# Patient Record
Sex: FEMALE | Race: BLACK OR AFRICAN AMERICAN | NOT HISPANIC OR LATINO | URBAN - METROPOLITAN AREA
[De-identification: names, ages, dates, MRNs, and addresses within clinical notes are randomized per-mention and may not be internally consistent; named-entity substitution may affect disease eponyms.]

---

## 2020-09-14 ENCOUNTER — HOSPITAL ENCOUNTER (EMERGENCY)
Facility: HOSPITAL | Age: 30
Discharge: HOME | End: 2020-09-15
Attending: EMERGENCY MEDICINE
Payer: MEDICAID

## 2020-09-14 DIAGNOSIS — O21.0 HYPEREMESIS GRAVIDARUM: Primary | ICD-10-CM

## 2020-09-14 LAB
ALBUMIN SERPL BCP-MCNC: 4.4 G/DL (ref 3.5–5.2)
ALP SERPL-CCNC: 48 U/L (ref 55–135)
ALT SERPL W/O P-5'-P-CCNC: 67 U/L (ref 10–44)
ANION GAP SERPL CALC-SCNC: 11 MMOL/L (ref 8–16)
AST SERPL-CCNC: 56 U/L (ref 10–40)
B-OH-BUTYR BLD STRIP-SCNC: 0.9 MMOL/L (ref 0–0.5)
BASOPHILS # BLD AUTO: 0.04 K/UL (ref 0–0.2)
BASOPHILS NFR BLD: 0.3 % (ref 0–1.9)
BILIRUB SERPL-MCNC: 0.8 MG/DL (ref 0.1–1)
BUN SERPL-MCNC: 6 MG/DL (ref 6–20)
CALCIUM SERPL-MCNC: 9.8 MG/DL (ref 8.7–10.5)
CHLORIDE SERPL-SCNC: 99 MMOL/L (ref 95–110)
CO2 SERPL-SCNC: 23 MMOL/L (ref 23–29)
CREAT SERPL-MCNC: 0.7 MG/DL (ref 0.5–1.4)
DIFFERENTIAL METHOD: ABNORMAL
EOSINOPHIL # BLD AUTO: 0 K/UL (ref 0–0.5)
EOSINOPHIL NFR BLD: 0.2 % (ref 0–8)
ERYTHROCYTE [DISTWIDTH] IN BLOOD BY AUTOMATED COUNT: 12.7 % (ref 11.5–14.5)
EST. GFR  (AFRICAN AMERICAN): >60 ML/MIN/1.73 M^2
EST. GFR  (NON AFRICAN AMERICAN): >60 ML/MIN/1.73 M^2
GLUCOSE SERPL-MCNC: 77 MG/DL (ref 70–110)
HCT VFR BLD AUTO: 44.2 % (ref 37–48.5)
HGB BLD-MCNC: 15.1 G/DL (ref 12–16)
IMM GRANULOCYTES # BLD AUTO: 0.05 K/UL (ref 0–0.04)
IMM GRANULOCYTES NFR BLD AUTO: 0.4 % (ref 0–0.5)
LYMPHOCYTES # BLD AUTO: 2 K/UL (ref 1–4.8)
LYMPHOCYTES NFR BLD: 16.5 % (ref 18–48)
MCH RBC QN AUTO: 31.1 PG (ref 27–31)
MCHC RBC AUTO-ENTMCNC: 34.2 G/DL (ref 32–36)
MCV RBC AUTO: 91 FL (ref 82–98)
MONOCYTES # BLD AUTO: 1.1 K/UL (ref 0.3–1)
MONOCYTES NFR BLD: 8.7 % (ref 4–15)
NEUTROPHILS # BLD AUTO: 9 K/UL (ref 1.8–7.7)
NEUTROPHILS NFR BLD: 73.9 % (ref 38–73)
NRBC BLD-RTO: 0 /100 WBC
PLATELET # BLD AUTO: 281 K/UL (ref 150–350)
PMV BLD AUTO: 9.9 FL (ref 9.2–12.9)
POTASSIUM SERPL-SCNC: 3.6 MMOL/L (ref 3.5–5.1)
PROT SERPL-MCNC: 7.7 G/DL (ref 6–8.4)
RBC # BLD AUTO: 4.85 M/UL (ref 4–5.4)
SODIUM SERPL-SCNC: 133 MMOL/L (ref 136–145)
WBC # BLD AUTO: 12.15 K/UL (ref 3.9–12.7)

## 2020-09-14 PROCEDURE — 82010 KETONE BODYS QUAN: CPT

## 2020-09-14 PROCEDURE — 96361 HYDRATE IV INFUSION ADD-ON: CPT

## 2020-09-14 PROCEDURE — 96367 TX/PROPH/DG ADDL SEQ IV INF: CPT

## 2020-09-14 PROCEDURE — 96366 THER/PROPH/DIAG IV INF ADDON: CPT

## 2020-09-14 PROCEDURE — 80053 COMPREHEN METABOLIC PANEL: CPT

## 2020-09-14 PROCEDURE — 96365 THER/PROPH/DIAG IV INF INIT: CPT

## 2020-09-14 PROCEDURE — 99284 PR EMERGENCY DEPT VISIT,LEVEL IV: ICD-10-PCS | Mod: ,,, | Performed by: EMERGENCY MEDICINE

## 2020-09-14 PROCEDURE — 80047 BASIC METABLC PNL IONIZED CA: CPT

## 2020-09-14 PROCEDURE — 81001 URINALYSIS AUTO W/SCOPE: CPT

## 2020-09-14 PROCEDURE — 99285 EMERGENCY DEPT VISIT HI MDM: CPT | Mod: 25

## 2020-09-14 PROCEDURE — 25000003 PHARM REV CODE 250: Performed by: NURSE PRACTITIONER

## 2020-09-14 PROCEDURE — 63600175 PHARM REV CODE 636 W HCPCS: Performed by: NURSE PRACTITIONER

## 2020-09-14 PROCEDURE — 99284 EMERGENCY DEPT VISIT MOD MDM: CPT | Mod: ,,, | Performed by: EMERGENCY MEDICINE

## 2020-09-14 PROCEDURE — 85025 COMPLETE CBC W/AUTO DIFF WBC: CPT

## 2020-09-14 RX ADMIN — PROMETHAZINE HYDROCHLORIDE 12.5 MG: 25 INJECTION INTRAMUSCULAR; INTRAVENOUS at 11:09

## 2020-09-14 RX ADMIN — SODIUM CHLORIDE 1000 ML: 0.9 INJECTION, SOLUTION INTRAVENOUS at 11:09

## 2020-09-15 VITALS
RESPIRATION RATE: 20 BRPM | SYSTOLIC BLOOD PRESSURE: 125 MMHG | HEIGHT: 61 IN | TEMPERATURE: 99 F | DIASTOLIC BLOOD PRESSURE: 66 MMHG | OXYGEN SATURATION: 100 % | WEIGHT: 155 LBS | BODY MASS INDEX: 29.27 KG/M2 | HEART RATE: 77 BPM

## 2020-09-15 LAB
BACTERIA #/AREA URNS AUTO: ABNORMAL /HPF
BILIRUB UR QL STRIP: NEGATIVE
BUN SERPL-MCNC: <3 MG/DL (ref 6–30)
CHLORIDE SERPL-SCNC: 102 MMOL/L (ref 95–110)
CLARITY UR REFRACT.AUTO: ABNORMAL
COLOR UR AUTO: ABNORMAL
CREAT SERPL-MCNC: 0.4 MG/DL (ref 0.5–1.4)
CTP QC/QA: YES
GLUCOSE SERPL-MCNC: 94 MG/DL (ref 70–110)
GLUCOSE UR QL STRIP: NEGATIVE
HCT VFR BLD CALC: 37 %PCV (ref 36–54)
HGB UR QL STRIP: NEGATIVE
HYALINE CASTS UR QL AUTO: 0 /LPF
KETONES UR QL STRIP: ABNORMAL
LEUKOCYTE ESTERASE UR QL STRIP: NEGATIVE
MICROSCOPIC COMMENT: ABNORMAL
NITRITE UR QL STRIP: NEGATIVE
PH UR STRIP: 5 [PH] (ref 5–8)
POC IONIZED CALCIUM: 1.22 MMOL/L (ref 1.06–1.42)
POC TCO2 (MEASURED): 19 MMOL/L (ref 23–29)
POTASSIUM BLD-SCNC: 3.4 MMOL/L (ref 3.5–5.1)
PROT UR QL STRIP: ABNORMAL
RBC #/AREA URNS AUTO: 2 /HPF (ref 0–4)
SAMPLE: ABNORMAL
SARS-COV-2 RDRP RESP QL NAA+PROBE: NEGATIVE
SODIUM BLD-SCNC: 137 MMOL/L (ref 136–145)
SP GR UR STRIP: >=1.03 (ref 1–1.03)
SQUAMOUS #/AREA URNS AUTO: 12 /HPF
URN SPEC COLLECT METH UR: ABNORMAL
WBC #/AREA URNS AUTO: 1 /HPF (ref 0–5)

## 2020-09-15 PROCEDURE — 93010 ELECTROCARDIOGRAM REPORT: CPT | Mod: ,,, | Performed by: INTERNAL MEDICINE

## 2020-09-15 PROCEDURE — 25000003 PHARM REV CODE 250: Performed by: NURSE PRACTITIONER

## 2020-09-15 PROCEDURE — 25000003 PHARM REV CODE 250: Performed by: EMERGENCY MEDICINE

## 2020-09-15 PROCEDURE — U0002 COVID-19 LAB TEST NON-CDC: HCPCS | Performed by: PHYSICIAN ASSISTANT

## 2020-09-15 PROCEDURE — 93005 ELECTROCARDIOGRAM TRACING: CPT

## 2020-09-15 PROCEDURE — 80047 BASIC METABLC PNL IONIZED CA: CPT

## 2020-09-15 PROCEDURE — 93010 EKG 12-LEAD: ICD-10-PCS | Mod: ,,, | Performed by: INTERNAL MEDICINE

## 2020-09-15 RX ORDER — DEXTROSE MONOHYDRATE, SODIUM CHLORIDE, AND POTASSIUM CHLORIDE 50; 1.49; 4.5 G/1000ML; G/1000ML; G/1000ML
1000 INJECTION, SOLUTION INTRAVENOUS
Status: COMPLETED | OUTPATIENT
Start: 2020-09-15 | End: 2020-09-15

## 2020-09-15 RX ADMIN — POTASSIUM CHLORIDE, DEXTROSE MONOHYDRATE AND SODIUM CHLORIDE 1000 ML: 150; 5; 450 INJECTION, SOLUTION INTRAVENOUS at 01:09

## 2020-09-15 RX ADMIN — SODIUM CHLORIDE 1000 ML: 0.9 INJECTION, SOLUTION INTRAVENOUS at 12:09

## 2020-09-15 NOTE — ED NOTES
I-STAT Chem-8+ Results:   Value Reference Range   Sodium 137 136-145 mmol/L   Potassium  3.4 3.5-5.1 mmol/L   Chloride 102  mmol/L   Ionized Calcium 1.22 1.06-1.42 mmol/L   CO2 (measured) 19 23-29 mmol/L   Glucose 94  mg/dL   BUN <3 6-30 mg/dL   Creatinine 0.4 0.5-1.4 mg/dL   Hematocrit 37 36-54%

## 2020-09-15 NOTE — ED NOTES
Charge nurse and isacc made aware that infusion started by previous nurse was completed. Pt to be transferred to ED bed.

## 2020-09-15 NOTE — ED PROVIDER NOTES
Encounter Date: 9/14/2020       History     Chief Complaint   Patient presents with    Vomiting     Third visit in a week for vomitting and discharge w/ reglan w/ minimal relief. Six week pregnant. Hot flashes. Denies any pelvic cramping, or bleeding.      This is the urgent evaluation a 29-year-old black female with no significant past medical history who presents emergency department complaining nausea, vomiting diarrhea that began on September 2nd.  Patient is reportedly 6 weeks pregnant with twins, she was seen at 2 other emergency departments over the last week with same symptoms.  She was seen on the a 11th at [facility] and had a full workup with an ultrasound that showed 2 gestational sacs with 2 small subchorionic bleeds.  She was discharged home with Reglan but is still unable tolerate any oral intake.  SHe has denies any vaginal bleeding with me today.  Patient reports that this is her 4th pregnancy and she has never experienced morning sickness in the past.  She denies any abdominal pain or any abdominal cramping.        Review of patient's allergies indicates:  No Known Allergies  No past medical history on file.  No past surgical history on file.  No family history on file.  Social History     Tobacco Use    Smoking status: Not on file   Substance Use Topics    Alcohol use: Not on file    Drug use: Not on file     Review of Systems   Constitutional: Negative for chills and fever.   Respiratory: Negative for cough and shortness of breath.    Cardiovascular: Negative for chest pain.   Gastrointestinal: Positive for diarrhea, nausea and vomiting. Negative for abdominal pain.   Musculoskeletal: Negative for back pain and neck pain.   Neurological: Negative for dizziness, weakness and headaches.   All other systems reviewed and are negative.      Physical Exam     Initial Vitals [09/14/20 2219]   BP Pulse Resp Temp SpO2   (!) 139/90 85 18 98.1 °F (36.7 °C) 98 %      MAP       --         Vitals:    09/14/20  "2219 09/15/20 0040   BP: (!) 139/90 (!) 108/59   Pulse: 85 65   Resp: 18 18   Temp: 98.1 °F (36.7 °C) 98.5 °F (36.9 °C)   TempSrc: Oral Oral   SpO2: 98% 97%   Weight: 70.3 kg (155 lb)    Height: 5' 1" (1.549 m)        Physical Exam    Nursing note and vitals reviewed.  Constitutional: Vital signs are normal. She appears well-developed and well-nourished. She appears ill. No distress.   Ill-appearing black female   HENT:   Head: Normocephalic and atraumatic.   Mouth/Throat: Mucous membranes are not pale and dry.   Eyes: Conjunctivae are normal. Pupils are equal, round, and reactive to light.   Neck: Neck supple.   Cardiovascular: Normal rate, regular rhythm, S1 normal, S2 normal and normal heart sounds. Exam reveals no gallop.    No murmur heard.  Pulmonary/Chest: Effort normal and breath sounds normal. No tachypnea. She has no decreased breath sounds. She has no wheezes.   Abdominal: Soft. Bowel sounds are normal. There is no abdominal tenderness.   Neurological: She is alert and oriented to person, place, and time. GCS eye subscore is 4. GCS verbal subscore is 5. GCS motor subscore is 6.   Skin: Skin is warm, dry and intact.   Psychiatric: She has a normal mood and affect. Her behavior is normal.         ED Course   Procedures  Labs Reviewed   CBC W/ AUTO DIFFERENTIAL - Abnormal; Notable for the following components:       Result Value    Mean Corpuscular Hemoglobin 31.1 (*)     Gran # (ANC) 9.0 (*)     Immature Grans (Abs) 0.05 (*)     Mono # 1.1 (*)     Gran% 73.9 (*)     Lymph% 16.5 (*)     All other components within normal limits   COMPREHENSIVE METABOLIC PANEL - Abnormal; Notable for the following components:    Sodium 133 (*)     Alkaline Phosphatase 48 (*)     AST 56 (*)     ALT 67 (*)     All other components within normal limits   URINALYSIS, REFLEX TO URINE CULTURE - Abnormal; Notable for the following components:    Appearance, UA Hazy (*)     Specific Gravity, UA >=1.030 (*)     Protein, UA 1+ (*)     " Ketones, UA 3+ (*)     All other components within normal limits    Narrative:     Specimen Source->Urine   BETA - HYDROXYBUTYRATE, SERUM - Abnormal; Notable for the following components:    Beta-Hydroxybutyrate 0.9 (*)     All other components within normal limits   URINALYSIS MICROSCOPIC - Abnormal; Notable for the following components:    Bacteria Many (*)     All other components within normal limits    Narrative:     Specimen Source->Urine          Imaging Results    None          Medical Decision Making:   Differential Diagnosis:   Dehydration, hyperemesis gravidarum, electrolyte imbalances, ketonuria  Clinical Tests:   Lab Tests: Ordered and Reviewed  ED Management:  29-year-old female with hyperemesis gravidarum over the past week and a half, previous ED visit she was discharged home with Reglan with no improvement and vomiting.  Patient was given a L of fluid here along with Phenergan, she is tolerating a minimal amount of oral intake.  Patient has ketones in her blood and urine, due to this I believe transfer to [facility] for OB services and IV hydration is needed.  Patient is agreeable to this transfer.              Attending Attestation:     Physician Attestation Statement for NP/PA:   I have conducted a face to face encounter with this patient in addition to the NP/PA, due to Medical Complexity    Other NP/PA Attestation Additions:      Medical Decision Makin-year-old female G4 at 6 weeks gestational age with twin gestation presenting to emergency department with complaint of nausea and vomiting.  This is her 3rd visit to the emergency department for this issue within 1 week.  She was previously seen at [facility] and [facility].  At [facility] on the , she had an ultrasound done which showed a twin gestation with 2 IUPs.  Both had a fetal heart rate.  The patient does not have any vaginal bleeding.  Here she was given Reglan and Phenergan but continues to have nausea and is unable to tolerate p.o..  Her urine  did have ketones.    We attempted to obtain bedside Dopplers of the fetal heart tones for this pregnancy, unable to obtain fetal heart tones.  I performed a bedside ultrasound which showed 2 gestational sacs, 1 with a fetal pole and 1 right could not identify a fetal pole.  I did not observe fetal heartbeat, however I was limited by my equipment.    Patient had also been ordered a rate of D5 0.45 NS with potassium supplementation for her hyperemesis gravidarum.  Unfortunately this was bolused instead of given as a rate of 100/hour as ordered.  I did recheck her potassium on an i-STAT, obtain an EKG, and check her serum blood glucose with no detrimental change.    As we do not have OB services here, I recommended transfer.  We offered her transfer within our system to [facility] which is the closest site in our system that has obstetrics and Gynecology, versus transfer to [facility] where her obstetrician is.  Patient requested transfer to [facility].  Case was facilitated through our transfer center, I spoke directly with the accepting physician.                           Clinical Impression:       ICD-10-CM ICD-9-CM   1. Hyperemesis gravidarum  O21.0 643.00                                  ELIZABETH Matt  09/15/20 0116       Dunia Cline MD  09/15/20 0558

## 2023-12-28 ENCOUNTER — HOSPITAL ENCOUNTER (EMERGENCY)
Facility: HOSPITAL | Age: 33
Discharge: HOME | End: 2023-12-28
Attending: EMERGENCY MEDICINE
Payer: MEDICAID

## 2023-12-28 VITALS
TEMPERATURE: 98 F | BODY MASS INDEX: 28.32 KG/M2 | RESPIRATION RATE: 17 BRPM | WEIGHT: 150 LBS | DIASTOLIC BLOOD PRESSURE: 81 MMHG | HEART RATE: 98 BPM | HEIGHT: 61 IN | OXYGEN SATURATION: 98 % | SYSTOLIC BLOOD PRESSURE: 146 MMHG

## 2023-12-28 DIAGNOSIS — K08.89 PAIN, DENTAL: Primary | ICD-10-CM

## 2023-12-28 DIAGNOSIS — K04.7 DENTAL ABSCESS: ICD-10-CM

## 2023-12-28 PROCEDURE — 25000003 PHARM REV CODE 250

## 2023-12-28 PROCEDURE — 41800 DRAINAGE OF GUM LESION: CPT

## 2023-12-28 PROCEDURE — 99283 EMERGENCY DEPT VISIT LOW MDM: CPT | Mod: 25

## 2023-12-28 RX ORDER — NAPROXEN 500 MG/1
500 TABLET ORAL
Status: COMPLETED | OUTPATIENT
Start: 2023-12-28 | End: 2023-12-28

## 2023-12-28 RX ORDER — NAPROXEN 500 MG/1
500 TABLET ORAL 2 TIMES DAILY WITH MEALS
Qty: 20 TABLET | Refills: 0 | Status: SHIPPED | OUTPATIENT
Start: 2023-12-28 | End: 2024-01-07

## 2023-12-28 RX ADMIN — NAPROXEN 500 MG: 500 TABLET ORAL at 03:12

## 2023-12-28 NOTE — ED PROVIDER NOTES
Encounter Date: 12/28/2023       History     Chief Complaint   Patient presents with    Dental Pain     Pain to left upper molar. Seen at [facility] earlier for same thing. States pain and swelling has gotten worse. Denies airway complaints     33-year-old female presenting to the emergency department for evaluation of persistent gum pain and worsening left facial swelling.  Patient reports that she went to outside emergency department earlier in the day and was evaluated.  There she was given amoxicillin and instructed to follow up with Dentistry.  She reports that she was given a topical oral numbing cream with minimal improvement.  She reports throughout the day she had worsening swelling and persistent pain.  She taken 2 doses of Augmentin at this time.  She reports that she does not currently have a dentist due to insurance issues.  Denies any fevers, nausea vomiting unable tolerate oral intake, difficulty swallowing or any other concerns at this time.    The history is provided by the patient.     Review of patient's allergies indicates:  No Known Allergies  No past medical history on file.  No past surgical history on file.  No family history on file.         Physical Exam     Initial Vitals [12/28/23 0155]   BP Pulse Resp Temp SpO2   (!) 146/81 98 17 98.2 °F (36.8 °C) 98 %      MAP       --       Physical Exam  Vitals and nursing note reviewed.   HENT:      Head: Normocephalic and atraumatic.      Mouth/Throat:      Dentition: Dental tenderness and dental abscesses present.   Cardiovascular:      Rate and Rhythm: Normal rate and regular rhythm.   Musculoskeletal:      Cervical back: Normal range of motion. No tenderness.   Neurological:      Mental Status: She is alert.         ED Course   I & D - Incision and Drainage    Date/Time: 12/28/2023 3:22 AM  Location procedure was performed: [facility] EMERGENCY DEPARTMENT    Performed by: Al Bedoya MD  Authorized by: Elijah Hensley MD  Type: abscess  Body area:  mouth  Anesthesia: local infiltration    Anesthesia:  Local Anesthetic: lidocaine 1% without epinephrine  Scalpel size: 11  Incision type: single straight  Complexity: simple  Drainage: serosanguinous  Drainage amount: moderate  Complications: No  Specimens: No  Implants: No  Patient tolerance: Patient tolerated the procedure well with no immediate complications        Labs Reviewed - No data to display       Imaging Results    None          Medications   naproxen tablet 500 mg (500 mg Oral Given 12/28/23 2962)     Medical Decision Making  33-year-old female who presented emergency department for facial swelling.  Upon assessment patient is alert oriented in no acute distress.  On examination patient has left facial swelling tenderness to the superior left gums.  Area of fluctuance concerning for abscess.  Abscess drained at bedside with improvement in swelling and pain at this time.  See procedure note for details.  At this time patient is stable for discharge.  Patient discharged after receiving a dose of naproxen and instructed to follow-up with dentistry and continue antibiotics as previously prescribed.  Patient at this time is agreeable with plan.    Risk  Prescription drug management.                                      Clinical Impression:  Final diagnoses:  [K08.89] Pain, dental (Primary)  [K04.7] Dental abscess                 Al Bedoya MD  Resident  12/28/23 7740

## 2023-12-28 NOTE — DISCHARGE INSTRUCTIONS
Control take naproxen as prescribed.  Follow-up with dentist.  Continue take antibiotics as previously prescribed.    If you develop muffled voice, difficulty swallowing, nausea vomiting unable tolerate oral intake, worsening fevers or any other new or concerning symptoms.   Size Of Lesion In Cm (Optional): 0 Detail Level: Simple

## 2023-12-28 NOTE — ED TRIAGE NOTES
Rhiannon Urban, a 33 y.o. female presents to the ED w/ complaint of left side facial swelling. States started in a tooth that bothers her on and off.    Triage note:  Chief Complaint   Patient presents with    Dental Pain     Pain to left upper molar. Seen at [facility] earlier for same thing. States pain and swelling has gotten worse. Denies airway complaints     Review of patient's allergies indicates:  No Known Allergies  No past medical history on file.     LOC: The patient is awake, alert, aware of environment with an appropriate affect. Oriented x4, speaking appropriately  APPEARANCE: Pt resting comfortably, in no acute distress, pt is clean and well groomed, clothing properly fastened  SKIN:The skin is warm and dry, color consistent with ethnicity, patient has normal skin turgor and moist mucus membranes, no bruising noted  obvious swelling to left side of cheek  RESPIRATORY:Airway is open and patent, respirations are spontaneous, patient has a normal effort and rate, no accessory muscle use noted.  CARDIAC: Normal rate and rhythm, no peripheral edema noted, capillary refill < 3 seconds, bilateral radial pulses 2+.  ABDOMEN: Soft, non tender, non distended. Denies v/d  NEUROLOGIC: PERRLA, facial expression is symmetrical, patient moving all extremities spontaneously, normal sensation in all extremities when touched with a finger.  Follows all commands appropriately  MUSCULOSKELETAL: Patient moving all extremities spontaneously, no obvious swelling or deformities noted.

## 2024-02-22 ENCOUNTER — HOSPITAL ENCOUNTER (EMERGENCY)
Facility: HOSPITAL | Age: 34
Discharge: HOME | End: 2024-02-22
Attending: EMERGENCY MEDICINE
Payer: MEDICAID

## 2024-02-22 VITALS
RESPIRATION RATE: 17 BRPM | TEMPERATURE: 100 F | OXYGEN SATURATION: 96 % | HEART RATE: 77 BPM | SYSTOLIC BLOOD PRESSURE: 126 MMHG | DIASTOLIC BLOOD PRESSURE: 75 MMHG

## 2024-02-22 DIAGNOSIS — J10.1 INFLUENZA B: ICD-10-CM

## 2024-02-22 DIAGNOSIS — U07.1 COVID-19: Primary | ICD-10-CM

## 2024-02-22 PROBLEM — O30.049 DICHORIONIC DIAMNIOTIC TWIN PREGNANCY: Status: ACTIVE | Noted: 2020-12-02

## 2024-02-22 PROBLEM — O23.00 PYELONEPHRITIS AFFECTING PREGNANCY: Status: ACTIVE | Noted: 2021-03-08

## 2024-02-22 PROBLEM — O21.1 HYPEREMESIS GRAVIDARUM BEFORE END OF 22 WEEK GESTATION WITH DEHYDRATION: Status: ACTIVE | Noted: 2020-09-15

## 2024-02-22 PROBLEM — O60.00 PRETERM LABOR: Status: ACTIVE | Noted: 2021-03-05

## 2024-02-22 PROBLEM — O35.BXX0 FETAL CARDIAC ANOMALY AFFECTING PREGNANCY, ANTEPARTUM: Status: ACTIVE | Noted: 2021-01-15

## 2024-02-22 LAB
B-HCG UR QL: NEGATIVE
CTP QC/QA: YES
INFLUENZA A, MOLECULAR: NEGATIVE
INFLUENZA B, MOLECULAR: POSITIVE
SARS-COV-2 RDRP RESP QL NAA+PROBE: POSITIVE
SPECIMEN SOURCE: ABNORMAL

## 2024-02-22 PROCEDURE — 25000003 PHARM REV CODE 250: Performed by: STUDENT IN AN ORGANIZED HEALTH CARE EDUCATION/TRAINING PROGRAM

## 2024-02-22 PROCEDURE — 99284 EMERGENCY DEPT VISIT MOD MDM: CPT

## 2024-02-22 PROCEDURE — 25000003 PHARM REV CODE 250: Performed by: PHYSICIAN ASSISTANT

## 2024-02-22 PROCEDURE — U0002 COVID-19 LAB TEST NON-CDC: HCPCS | Performed by: STUDENT IN AN ORGANIZED HEALTH CARE EDUCATION/TRAINING PROGRAM

## 2024-02-22 PROCEDURE — 87502 INFLUENZA DNA AMP PROBE: CPT | Performed by: STUDENT IN AN ORGANIZED HEALTH CARE EDUCATION/TRAINING PROGRAM

## 2024-02-22 PROCEDURE — 81025 URINE PREGNANCY TEST: CPT | Performed by: PHYSICIAN ASSISTANT

## 2024-02-22 RX ORDER — BENZONATATE 100 MG/1
100 CAPSULE ORAL 3 TIMES DAILY PRN
Qty: 20 CAPSULE | Refills: 0 | Status: SHIPPED | OUTPATIENT
Start: 2024-02-22 | End: 2024-03-03

## 2024-02-22 RX ORDER — OSELTAMIVIR PHOSPHATE 75 MG/1
75 CAPSULE ORAL 2 TIMES DAILY
Qty: 10 CAPSULE | Refills: 0 | Status: SHIPPED | OUTPATIENT
Start: 2024-02-22 | End: 2024-02-27

## 2024-02-22 RX ORDER — IBUPROFEN 600 MG/1
600 TABLET ORAL
Status: COMPLETED | OUTPATIENT
Start: 2024-02-22 | End: 2024-02-22

## 2024-02-22 RX ORDER — NAPROXEN 500 MG/1
500 TABLET ORAL 2 TIMES DAILY PRN
Qty: 20 TABLET | Refills: 0 | Status: SHIPPED | OUTPATIENT
Start: 2024-02-22

## 2024-02-22 RX ORDER — BENZONATATE 100 MG/1
100 CAPSULE ORAL
Status: COMPLETED | OUTPATIENT
Start: 2024-02-22 | End: 2024-02-22

## 2024-02-22 RX ORDER — ONDANSETRON 8 MG/1
8 TABLET, ORALLY DISINTEGRATING ORAL
Status: COMPLETED | OUTPATIENT
Start: 2024-02-22 | End: 2024-02-22

## 2024-02-22 RX ORDER — ONDANSETRON 4 MG/1
4 TABLET, ORALLY DISINTEGRATING ORAL EVERY 6 HOURS PRN
Qty: 15 TABLET | Refills: 0 | Status: SHIPPED | OUTPATIENT
Start: 2024-02-22

## 2024-02-22 RX ORDER — ACETAMINOPHEN 325 MG/1
650 TABLET ORAL
Status: COMPLETED | OUTPATIENT
Start: 2024-02-22 | End: 2024-02-22

## 2024-02-22 RX ADMIN — IBUPROFEN 600 MG: 600 TABLET, FILM COATED ORAL at 07:02

## 2024-02-22 RX ADMIN — ACETAMINOPHEN 650 MG: 325 TABLET ORAL at 07:02

## 2024-02-22 RX ADMIN — BENZONATATE 100 MG: 100 CAPSULE ORAL at 08:02

## 2024-02-22 RX ADMIN — ONDANSETRON 8 MG: 8 TABLET, ORALLY DISINTEGRATING ORAL at 08:02

## 2024-02-22 NOTE — Clinical Note
"Relistlucia Urban (relista) was seen and treated in our emergency department on 2/22/2024.     COVID-19 is present in our communities across the state. There is limited testing for COVID at this time, so not all patients can be tested. In this situation, your employee meets the following criteria:    Rhiannon Urban has met the criteria for COVID-19 testing and has a POSITIVE result. She can return to work once they are asymptomatic for 24 hours without the use of fever reducing medications AND at least five days from the first positive result. A mask is recommended for 5 days post quarantine.     If you have any questions or concerns, or if I can be of further assistance, please do not hesitate to contact me.    Sincerely,             Chela Monterroso PA-C"

## 2024-02-22 NOTE — Clinical Note
"Rhiannon "ilda Urban was seen and treated in our emergency department on 2/22/2024.  She may return to work on 02/28/2024.       If you have any questions or concerns, please don't hesitate to call.      Chela Monterroso PA-C"

## 2024-02-23 NOTE — DISCHARGE INSTRUCTIONS
Diagnosis:  COVID 19, influenza B    You tested positive for both COVID-19 and the flu.  I am prescribing medicine for flu called Tamiflu.  This has been shown to decrease your duration of symptoms but only works within the 1st 48 hours some symptoms so I am not sure if this will help.  I am also prescribing medicine for nausea, pain and cough that you can take as needed. You should take Tylenol as needed for pain up to 3 grams daily which is 6 of the 500 mg extra strength tablets.  If you have no appetite, make sure that you are drinking fluids.  You can drink something with electrolytes such as Pedialyte, Gatorade, coconut water, Liquid Iv, etc.     It is common to feel sick for a full week with the flu.  You can also be sick with COVID-19 for a full week so I think that you should self isolate for at least a week.  When you are feeling better, please schedule a follow-up appointment with your primary care doctor.  If you start to have any worsening symptoms, or new symptoms such as passing out, inability to hold down fluids, trouble breathing or other concerns please return to the emergency department immediately.

## 2024-02-23 NOTE — ED PROVIDER NOTES
Encounter Date: 2/22/2024       History     Chief Complaint   Patient presents with    Flu Like Symptoms     Patient reports chills, generalized body aches, nausea. States that she has had sick contacts at work.     33-year-old female with no significant past medical history presents for chills, sweats, myalgias, headache, cough, vomiting and sore throat for about 4 or 5 days.  Reports that many of her coworkers are also sick.      Review of patient's allergies indicates:  No Known Allergies  History reviewed. No pertinent past medical history.  History reviewed. No pertinent surgical history.  History reviewed. No pertinent family history.     Review of Systems    Physical Exam     Initial Vitals [02/22/24 1917]   BP Pulse Resp Temp SpO2   128/81 97 18 100 °F (37.8 °C) 97 %      MAP       --         Physical Exam    Nursing note and vitals reviewed.  Constitutional: She appears well-developed and well-nourished.   HENT:   Nose: Rhinorrhea present.   Mouth/Throat: Uvula is midline, oropharynx is clear and moist and mucous membranes are normal.   Cardiovascular:  Normal rate, regular rhythm, normal heart sounds and intact distal pulses.           Pulmonary/Chest: Breath sounds normal.   Abdominal: Abdomen is soft. Bowel sounds are normal. She exhibits no distension. There is no abdominal tenderness.   Musculoskeletal:         General: Normal range of motion.     Lymphadenopathy:     She has no cervical adenopathy.   Neurological: She is alert and oriented to person, place, and time.         ED Course   Procedures  Labs Reviewed   INFLUENZA A & B BY MOLECULAR - Abnormal; Notable for the following components:       Result Value    Influenza B, Molecular Positive (*)     All other components within normal limits   SARS-COV-2 RNA AMPLIFICATION, QUAL - Abnormal; Notable for the following components:    SARS-CoV-2 RNA, Amplification, Qual Positive (*)     All other components within normal limits   POCT URINE PREGNANCY           Imaging Results    None          Medications   acetaminophen tablet 650 mg (650 mg Oral Given 2/22/24 1931)   ibuprofen tablet 600 mg (600 mg Oral Given 2/22/24 1931)   ondansetron disintegrating tablet 8 mg (8 mg Oral Given 2/22/24 2052)   benzonatate capsule 100 mg (100 mg Oral Given 2/22/24 2051)     Medical Decision Making  33-year-old female presenting for cough, fever, myalgias, multiple other symptoms.  Her vitals are normal, she appears ill but nontoxic.  Differential diagnosis includes COVID-19, influenza, pregnancy, viral syndrome.    COVID-19 test is positive.  Influenza B test is also positive.  Given unclear timeline will treat with Tamiflu.  I instructed the patient to self isolate to avoid infecting others.  Patient provided with medications for symptomatic control and instructed to follow up with PCP after quarantine and return to the ED for any worsening symptoms. Stressed the importance of follow-up, strict ED return precautions given.  Patient voiced understanding and is comfortable with discharge.      Amount and/or Complexity of Data Reviewed  Labs: ordered. Decision-making details documented in ED Course.    Risk  Prescription drug management.               ED Course as of 02/23/24 0151   u Feb 22, 2024 2036 SARS-CoV-2 RNA, Amplification, Qual(!): Positive [CC]   2036 Influenza B, Molecular(!): Positive [CC]   2050 hCG Qualitative, Urine: Negative [CC]   2053 Tolerating p.o. intake [CC]      ED Course User Index  [CC] Chela Monterroso PA-C                           Clinical Impression:  Final diagnoses:  [J10.1] Influenza B  [U07.1] COVID-19 (Primary)          ED Disposition Condition    Discharge Stable          ED Prescriptions       Medication Sig Dispense Start Date End Date Auth. Provider    oseltamivir (TAMIFLU) 75 MG capsule Take 1 capsule (75 mg total) by mouth 2 (two) times daily. for 5 days 10 capsule 2/22/2024 2/27/2024 Chela Monterroso PA-C    ondansetron  (ZOFRAN-ODT) 4 MG TbDL Take 1 tablet (4 mg total) by mouth every 6 (six) hours as needed (Nausea). 15 tablet 2/22/2024 -- Chela Monterroso PA-C    benzonatate (TESSALON) 100 MG capsule Take 1 capsule (100 mg total) by mouth 3 (three) times daily as needed for Cough. 20 capsule 2/22/2024 3/3/2024 Chela Monterroso PA-C    naproxen (NAPROSYN) 500 MG tablet Take 1 tablet (500 mg total) by mouth 2 (two) times daily as needed (Pain). 20 tablet 2/22/2024 -- Chela Monterroso PA-C          Follow-up Information       Follow up With Specialties Details Why Contact [facility] - Family Family Medicine Schedule an appointment as soon as possible for a visit in 1 week  [address] [facility] [address]  [phone]      [facility] - Emergency Dept Emergency Medicine Go to  If symptoms worsen [id number]  [facility] [id number] [phone]             Chela Monterroso PA-C  02/23/24 0151

## 2024-02-23 NOTE — FIRST PROVIDER EVALUATION
Medical screening examination initiated.  I have conducted a focused provider triage encounter, findings are as follows:    Brief history of present illness:  Concerned she has the flu. Reports body aches, chills, nausea, and vomiting x 2 days. Reports chest burning with coughing. Somebody at work was also sick.     Vitals:    02/22/24 1917   BP: 128/81   BP Location: Left arm   Patient Position: Lying   Pulse: 97   Resp: 18   Temp: 100 °F (37.8 °C)   TempSrc: Oral   SpO2: 97%       Pertinent physical exam:  Ambulatory, satting well on RA, no acute distress, borderline febrile in ED    Brief workup plan:  viral testing    Preliminary workup initiated; this workup will be continued and followed by the physician or advanced practice provider that is assigned to the patient when roomed.

## 2024-02-23 NOTE — ED NOTES
Patient identifiers for Rhiannon Urban 33 y.o. female checked and correct.  Chief Complaint   Patient presents with    Flu Like Symptoms     Patient reports chills, generalized body aches, nausea. States that she has had sick contacts at work.     Pt states started w/ sore throat 4 days ago and progressed to cough, body aches, chills. +N/V especially after coughing. +diarrhea.  History reviewed. No pertinent past medical history.  Allergies reported: Review of patient's allergies indicates:  No Known Allergies      HEENT: Denies vision changes. Denies ear drainage or hearing loss. No c/o nasal drainage. Denies dysphagia or voice changes.   Appearance: Pt awake, alert & oriented to person, place & time. Pt in no acute distress at present time. Pt is clean and well groomed with clothes appropriately fastened.   Skin: Skin warm, dry & intact. Color consistent with ethnicity. Mucous membranes moist. No breakdown or brusing noted.   Musculoskeletal: Patient moving all extremities well, no obvious swelling or deformities noted.   Respiratory: Respirations spontaneous, even, and non-labored. Visible chest rise noted. Airway is open and patent. No accessory muscle use noted.   Neurologic: Sensation is intact. Speech is clear and appropriate. Eyes open spontaneously, behavior appropriate to situation, follows commands, facial expression symmetrical, bilateral hand grasp equal and even, purposeful motor response noted.  Cardiac: All peripheral pulses present. No Bilateral lower extremity edema. Cap refill is <3 seconds.  Abdomen: Abdomen soft, non distended, non tender to palpation.+ abd pain + N/V/D   : Pt voids independently, denies dysuria, hematuria, frequency.

## 2024-02-23 NOTE — ED PROVIDER NOTES
Encounter Date: 2/22/2024       History     Chief Complaint   Patient presents with    Flu Like Symptoms     Patient reports chills, generalized body aches, nausea. States that she has had sick contacts at work.     Patient is a 34 yo F who presents with chills, body aches, nausea. Has sick contacts at work.    The history is provided by the patient.     Review of patient's allergies indicates:  No Known Allergies  History reviewed. No pertinent past medical history.  History reviewed. No pertinent surgical history.  History reviewed. No pertinent family history.         Physical Exam     Initial Vitals [02/22/24 1917]   BP Pulse Resp Temp SpO2   128/81 97 18 100 °F (37.8 °C) 97 %      MAP       --         Physical Exam    Nursing note and vitals reviewed.  Constitutional: She appears well-developed and well-nourished. She is not diaphoretic. No distress.   HENT:   Head: Normocephalic and atraumatic.   Eyes: Conjunctivae and EOM are normal.   Neck: Neck supple.   Normal range of motion.  Cardiovascular:  Normal rate and regular rhythm.           Pulmonary/Chest: No respiratory distress.   Abdominal: She exhibits no distension.   Musculoskeletal:      Cervical back: Normal range of motion and neck supple.     Neurological: She is alert and oriented to person, place, and time. GCS score is 15. GCS eye subscore is 4. GCS verbal subscore is 5. GCS motor subscore is 6.   Skin: Skin is warm and dry.   Psychiatric: She has a normal mood and affect. Her behavior is normal. Judgment and thought content normal.         ED Course   Procedures  Labs Reviewed   INFLUENZA A & B BY MOLECULAR - Abnormal; Notable for the following components:       Result Value    Influenza B, Molecular Positive (*)     All other components within normal limits   SARS-COV-2 RNA AMPLIFICATION, QUAL - Abnormal; Notable for the following components:    SARS-CoV-2 RNA, Amplification, Qual Positive (*)     All other components within normal limits   POCT  URINE PREGNANCY          Imaging Results    None          Medications   acetaminophen tablet 650 mg (650 mg Oral Given 2/22/24 1931)   ibuprofen tablet 600 mg (600 mg Oral Given 2/22/24 1931)   ondansetron disintegrating tablet 8 mg (8 mg Oral Given 2/22/24 2052)   benzonatate capsule 100 mg (100 mg Oral Given 2/22/24 2051)     Medical Decision Making  Patient is pos for flu and covid which is likely cause of her symptoms  Started on tamiflu  Doesn't have risk factors for severe covid   She is nontoxic, well appearing  Discharged to home in stable condition, return to ED warnings given, follow up and patient care instructions given.          Amount and/or Complexity of Data Reviewed  Labs: ordered. Decision-making details documented in ED Course.    Risk  Prescription drug management.               ED Course as of 02/26/24 1150   Thu Feb 22, 2024 2036 SARS-CoV-2 RNA, Amplification, Qual(!): Positive [CC]   2036 Influenza B, Molecular(!): Positive [CC]   2050 hCG Qualitative, Urine: Negative [CC]   2053 Tolerating p.o. intake [CC]      ED Course User Index  [CC] Chela Monterroso PA-C                           Clinical Impression:  Final diagnoses:  [J10.1] Influenza B  [U07.1] COVID-19 (Primary)          ED Disposition Condition    Discharge Stable          ED Prescriptions       Medication Sig Dispense Start Date End Date Auth. Provider    oseltamivir (TAMIFLU) 75 MG capsule Take 1 capsule (75 mg total) by mouth 2 (two) times daily. for 5 days 10 capsule 2/22/2024 2/27/2024 Chela Monterroso PA-C    ondansetron (ZOFRAN-ODT) 4 MG TbDL Take 1 tablet (4 mg total) by mouth every 6 (six) hours as needed (Nausea). 15 tablet 2/22/2024 -- Chela Monterroso PA-C    benzonatate (TESSALON) 100 MG capsule Take 1 capsule (100 mg total) by mouth 3 (three) times daily as needed for Cough. 20 capsule 2/22/2024 3/3/2024 Chela Monterroso PA-C    naproxen (NAPROSYN) 500 MG tablet Take 1 tablet (500 mg total) by  mouth 2 (two) times daily as needed (Pain). 20 tablet 2/22/2024 -- Chela Monterroso PA-C          Follow-up Information       Follow up With Specialties Details Why Contact [facility] Schedule an appointment as soon as possible for a visit in 1 week  [address] [facility] [address]  [phone]      [facility] - Emergency Dept Emergency Medicine Go to  If symptoms worsen [address] [facility] [id number] [phone]             Mihaela Spencer MD  02/26/24 9577

## 2024-03-25 ENCOUNTER — HOSPITAL ENCOUNTER (EMERGENCY)
Facility: HOSPITAL | Age: 34
Discharge: HOME | End: 2024-03-25
Attending: EMERGENCY MEDICINE
Payer: MEDICAID

## 2024-03-25 VITALS
TEMPERATURE: 98 F | WEIGHT: 150 LBS | OXYGEN SATURATION: 100 % | DIASTOLIC BLOOD PRESSURE: 80 MMHG | RESPIRATION RATE: 18 BRPM | BODY MASS INDEX: 28.32 KG/M2 | HEART RATE: 90 BPM | HEIGHT: 61 IN | SYSTOLIC BLOOD PRESSURE: 140 MMHG

## 2024-03-25 DIAGNOSIS — S51.812A LACERATION OF LEFT FOREARM, INITIAL ENCOUNTER: Primary | ICD-10-CM

## 2024-03-25 PROCEDURE — 99283 EMERGENCY DEPT VISIT LOW MDM: CPT | Mod: 25

## 2024-03-25 PROCEDURE — 12001 RPR S/N/AX/GEN/TRNK 2.5CM/<: CPT

## 2024-03-25 NOTE — Clinical Note
"Rhiannon "ilda Urban was seen and treated in our emergency department on 3/25/2024.  She may return to work on 03/28/2024.       If you have any questions or concerns, please don't hesitate to call.      Alisia Fraire PA-C"

## 2024-03-26 NOTE — DISCHARGE INSTRUCTIONS
You will need to have your stitches removed in 7-10 days. Keep clean but do not submerge in water. No pools, hot tubs, or other dirty water sources. If you have any signs of infection such as redness, swelling, drainage, or fevers return to the ED. Follow up with your primary care provider.

## 2024-03-26 NOTE — ED TRIAGE NOTES
Rhiannon Urban, a 33 y.o. female presents to the ED w/ complaint of laceration to right lower arm. Pt was trying to open vanda toy.    Triage note:  Chief Complaint   Patient presents with    Laceration     Patient was trying to open a toy package with a steak knife and accidentally cut her L forearm. Wound is wrapped with a pressure dressing. Pulses intact, sensation intact, cap refill WNL.     Review of patient's allergies indicates:  No Known Allergies  No past medical history on file.Patient identifiers for Rhiannon Urban checked and correct.    LOC: The patient is awake, alert and aware of environment with an appropriate affect, the patient is oriented x 4 and speaking appropriately.    APPEARANCE: Patient resting comfortably and in no acute distress, patient is clean and well groomed, patient's clothing is properly fastened.    SKIN: The skin is warm and dry, color consistent with ethnicity, patient has normal skin turgor and moist mucus membranes, skin intact, no breakdown or bruising noted.    MUSCULOSKELETAL: Patient not moving all extremities well, with obvious swelling or deformities noted. Right arm laceration.    RESPIRATORY: Airway is open and patent, respirations are spontaneous and even, patient has a normal effort and rate.    CARDIAC: Patient has a normal rate and rhythm, no periphreal edema noted, capillary refill < 3 seconds.    ABDOMEN: Soft and non tender to palpation, no distention noted. Patient denies any nausea, vomiting, diarrhea, or constipation.     NEUROLOGIC: Eyes open spontaneously, PERRL, behavior appropriate to situation, follows commands, facial expression symmetrical, bilateral hand grasp equal and even, purposeful motor response noted, normal sensation in all extremities.     HEENT: No abnormalities noted. White sclera and pupils equal round and reactive to light. Denies headache, dizziness.     : Pt voids independently, denies dysuria, hematuria, frequency.

## 2024-03-26 NOTE — ED PROVIDER NOTES
Encounter Date: 3/25/2024       History     Chief Complaint   Patient presents with    Laceration     Patient was trying to open a toy package with a steak knife and accidentally cut her L forearm. Wound is wrapped with a pressure dressing. Pulses intact, sensation intact, cap refill WNL.     33-year-old female with no significant medical history presents to the ED regarding laceration to her left forearm just prior to arrival.  She was trying to open a toy package with a steak knife and accidentally cut her L forearm.  Reports pain but denies any paresthesias.  No other injuries or complaints this time.  She states she had her last tetanus about 6-7 months ago.    The history is provided by the patient and medical records.     Review of patient's allergies indicates:  No Known Allergies  History reviewed. No pertinent past medical history.  History reviewed. No pertinent surgical history.  History reviewed. No pertinent family history.  Social History     Tobacco Use    Smoking status: Never    Smokeless tobacco: Never   Substance Use Topics    Alcohol use: Not Currently    Drug use: Not Currently     Review of Systems   Reason unable to perform ROS: See HPI.       Physical Exam     Initial Vitals   BP Pulse Resp Temp SpO2   03/25/24 1630 03/25/24 1630 03/25/24 1630 03/25/24 1633 03/25/24 1630   138/84 96 (!) 24 98.3 °F (36.8 °C) 100 %      MAP       --                Physical Exam    Vitals reviewed.  Constitutional: She appears well-developed and well-nourished. She is not diaphoretic. No distress.   HENT:   Head: Normocephalic and atraumatic.   Neck: Neck supple.   Cardiovascular:  Normal rate and intact distal pulses.           Pulmonary/Chest: No respiratory distress.   Musculoskeletal:      Left elbow: Normal range of motion. No tenderness.      Left forearm: Laceration and tenderness present.      Left wrist: No swelling. Normal range of motion. Normal pulse.      Cervical back: Neck supple.      Neurological: She is alert and oriented to person, place, and time. She has normal strength. No sensory deficit.   Psychiatric: She has a normal mood and affect.         ED Course   Lac Repair    Date/Time: 3/25/2024 9:20 PM    Performed by: Alisia Fraire PA-C  Authorized by: Delia Blanco MD    Consent:     Consent obtained:  Verbal    Consent given by:  Patient    Risks discussed:  Retained foreign body, pain and infection  Universal protocol:     Patient identity confirmed:  Verbally with patient  Anesthesia:     Anesthesia method:  Local infiltration    Local anesthetic:  Lidocaine 1% w/o epi  Laceration details:     Location:  Shoulder/arm    Shoulder/arm location:  L lower arm    Length (cm):  1.5    Depth (mm):  2  Pre-procedure details:     Preparation:  Patient was prepped and draped in usual sterile fashion  Exploration:     Hemostasis achieved with:  Direct pressure    Wound exploration: wound explored through full range of motion and entire depth of wound visualized      Contaminated: no    Treatment:     Area cleansed with:  Saline    Amount of cleaning:  Standard    Irrigation solution:  Sterile saline    Irrigation method:  Pressure wash    Visualized foreign bodies/material removed: no    Skin repair:     Repair method:  Sutures    Suture size:  3-0    Suture material:  Nylon    Suture technique:  Simple interrupted    Number of sutures:  3  Approximation:     Approximation:  Close  Repair type:     Repair type:  Simple  Post-procedure details:     Dressing:  Non-adherent dressing    Procedure completion:  Tolerated well, no immediate complications    Labs Reviewed   HIV 1 / 2 ANTIBODY   HEPATITIS C ANTIBODY          Imaging Results    None          Medications - No data to display  Medical Decision Making       APC / Resident Notes:   Emergent evaluation of 33-year-old female presenting with left forearm laceration.  Vitals stable.  Clinically well-appearing.  Left arm neurovascularly  intact.  See laceration above.  Patient up-to-date on her tetanus. Low suspicion for retained foreign body given mechanism of injury and full depth of wound visualized.    See procedure note for laceration her.  See ED course.  I have reviewed the patient's records and discussed with my supervising physician.          ED Course as of 03/25/24 2144   Mon Mar 25, 2024   2118 Patient educated on wound care and need for removal of sutures in 7-10 days. Advised to follow up with PCP and strict ED return precautions given with all questions answered. Patient verbalized understanding and agreed to plan.  [KB]      ED Course User Index  [KB] Alisia Fraire PA-C                           Clinical Impression:  Final diagnoses:  [S59.874C] Laceration of left forearm, initial encounter (Primary)          ED Disposition Condition    Discharge Stable          ED Prescriptions    None       Follow-up Information       Follow up With Specialties Details Why Contact Info    [facility] - Emergency Dept Emergency Medicine Go to  If symptoms worsen [address]  [facility] [id number]  [phone]             Alisia Fraire PA-C  03/25/24 2145

## 2024-05-27 PROBLEM — O23.00 PYELONEPHRITIS AFFECTING PREGNANCY: Status: RESOLVED | Noted: 2021-03-08 | Resolved: 2024-05-27

## 2025-07-17 ENCOUNTER — HOSPITAL ENCOUNTER (EMERGENCY)
Facility: HOSPITAL | Age: 35
Discharge: HOME | End: 2025-07-17
Attending: EMERGENCY MEDICINE
Payer: MEDICAID

## 2025-07-17 VITALS
RESPIRATION RATE: 19 BRPM | SYSTOLIC BLOOD PRESSURE: 120 MMHG | TEMPERATURE: 98 F | BODY MASS INDEX: 29.27 KG/M2 | HEIGHT: 61 IN | HEART RATE: 89 BPM | DIASTOLIC BLOOD PRESSURE: 88 MMHG | WEIGHT: 155 LBS | OXYGEN SATURATION: 98 %

## 2025-07-17 DIAGNOSIS — S93.401A SPRAIN OF RIGHT ANKLE, UNSPECIFIED LIGAMENT, INITIAL ENCOUNTER: Primary | ICD-10-CM

## 2025-07-17 DIAGNOSIS — M25.579 ANKLE PAIN: ICD-10-CM

## 2025-07-17 PROCEDURE — 29515 APPLICATION SHORT LEG SPLINT: CPT | Mod: RT

## 2025-07-17 PROCEDURE — 99283 EMERGENCY DEPT VISIT LOW MDM: CPT | Mod: 25

## 2025-07-17 PROCEDURE — 25000003 PHARM REV CODE 250: Performed by: EMERGENCY MEDICINE

## 2025-07-17 RX ORDER — NAPROXEN 500 MG/1
500 TABLET ORAL
Status: COMPLETED | OUTPATIENT
Start: 2025-07-17 | End: 2025-07-17

## 2025-07-17 RX ORDER — NAPROXEN 500 MG/1
500 TABLET ORAL 2 TIMES DAILY PRN
Qty: 20 TABLET | Refills: 0 | Status: SHIPPED | OUTPATIENT
Start: 2025-07-17

## 2025-07-17 RX ADMIN — NAPROXEN 500 MG: 500 TABLET ORAL at 03:07

## 2025-07-17 NOTE — ED PROVIDER NOTES
Encounter Date: 7/17/2025       History     Chief Complaint   Patient presents with    Ankle Injury     R ankle injury yest     33 yo W with no significant pmhx presents with a chief complaint of right ankle injury.  Patient was walking down the stairs with a railing broke off and she fell down 3 steps onto her bottom, inverting her right ankle.  She has had pain in the anterior ankle since that time.  Pain worsens with palpation.  No weakness, paresthesias.  No previous injury to the right ankle.  She has not taken any medication for the pain.  She is able to ambulate with some guarding.  No head trauma, syncope with fall.    The history is provided by the patient.     Review of patient's allergies indicates:  No Known Allergies  History reviewed. No pertinent past medical history.  History reviewed. No pertinent surgical history.  No family history on file.  Social History[1]  Review of Systems    Physical Exam     Initial Vitals [07/17/25 1400]   BP Pulse Resp Temp SpO2   (!) 120/91 92 16 98 °F (36.7 °C) 98 %      MAP       --         Physical Exam    Nursing note and vitals reviewed.  Constitutional: She appears well-developed and well-nourished. She is not diaphoretic. No distress.   HENT:   Head: Normocephalic.   Cardiovascular:  Normal rate.           2+ right DP and PT pulses   Pulmonary/Chest: No stridor. No respiratory distress.   Musculoskeletal:      Right lower leg: Normal.      Right ankle: Swelling present. No ecchymosis or lacerations. Tenderness present over the lateral malleolus, medial malleolus, ATF ligament and AITF ligament. No posterior TF ligament, base of 5th metatarsal or proximal fibula tenderness. Decreased range of motion (Not rigid).      Right Achilles Tendon: Normal. No tenderness or defects. Hernandez's test negative.     Neurological: She is alert.   Strength and sensation preserved throughout right foot         ED Course   Procedures  Labs Reviewed   POCT URINE PREGNANCY           Imaging Results              X-Ray Ankle Complete Right (Final result)  Result time 07/17/25 15:06:09      Final result by Luis Pa MD (07/17/25 15:06:09)                   Impression:      1. No acute displaced fracture or dislocation of the ankle.      Electronically signed by: Luis Pa MD  Date:    07/17/2025  Time:    15:06               Narrative:    EXAMINATION:  XR ANKLE COMPLETE 3 VIEW RIGHT    CLINICAL HISTORY:  Pain in unspecified ankle and joints of unspecified foot    TECHNIQUE:  AP, lateral, and oblique images of the right ankle were performed.    COMPARISON:  None    FINDINGS:  Three views right ankle.    No acute displaced fracture or dislocation of the ankle.  No radiopaque foreign body.  The ankle mortise is intact.                                       Medications   naproxen tablet 500 mg (500 mg Oral Given 7/17/25 1515)     Medical Decision Making  35 yo W with no significant pmhx presents with a chief complaint of right ankle injury.     Differential includes, strain, sprain, fracture    No evidence of Achilles rupture.  No evidence of compartment syndrome.  Neurovascularly intact.  Will administer ice, NSAIDs, obtain plain films.    Reassessment: X-ray negative for any fracture.  Suspect symptoms are due to a ligamentous strain.  Patient provided with instructions to RICE.  Ankle stirrup splint provided.  Prescription for NSAIDs.  Return precautions.  She is comfortable with discharge, follow up.    Risk  Prescription drug management.                                          Clinical Impression:  Final diagnoses:  [M25.579] Ankle pain  [S93.401A] Sprain of right ankle, unspecified ligament, initial encounter (Primary)          ED Disposition Condition    Discharge Stable          ED Prescriptions       Medication Sig Dispense Start Date End Date Auth. Provider    naproxen (NAPROSYN) 500 MG tablet Take 1 tablet (500 mg total) by mouth 2 (two) times daily as needed (Pain). 20  tablet 7/17/2025 -- Kirk Renee MD          Follow-up Information       Follow up With Specialties Details Why Contact Info    [facility] - Emergency Dept Emergency Medicine  As needed, If symptoms worsen [address]  [facility] [id number] [phone]                   [1]   Social History  Tobacco Use    Smoking status: Never    Smokeless tobacco: Never   Substance Use Topics    Alcohol use: Not Currently    Drug use: Not Currently        Kirk Renee MD  07/17/25 7841

## 2025-07-17 NOTE — FIRST PROVIDER EVALUATION
"Medical screening exam completed.  I have conducted a focused provider triage encounter, findings are as follows:    Brief history of present illness:  Here with right ankle pain. Fell down three steps yesterday with the rail broke. She states she twisted her ankle. No head trauma.     Vitals:    07/17/25 1400   BP: (!) 120/91   Pulse: 92   Resp: 16   Temp: 98 °F (36.7 °C)   TempSrc: Oral   SpO2: 98%   Weight: 70.3 kg (155 lb)   Height: 5' 1" (1.549 m)       Pertinent physical exam:  Awake, alert, NAD, ambulatory    Brief workup plan:  XR, UPT    Preliminary workup initiated; this workup will be continued and followed by the physician or advanced practice provider that is assigned to the patient when roomed.   "

## 2025-07-17 NOTE — ED TRIAGE NOTES
Rhiannon Urban, a 34 y.o. female presents to the ED w/ complaint of right ankle pain. +swelling. States she was walking down the steps when she twisted it and fell.  Fell down 3 step on backside.  -loc or hitting her head.

## 2025-07-17 NOTE — DISCHARGE INSTRUCTIONS
As explained, it is very important you rest, ice, elevate your ankle as much as possible over the next 48 hours.  Take naproxen twice a day as needed for pain.  Wear the ankle stirrup to help support your ankle but walk on it less than usual.  Follow up with your primary care doctor if you were still unable to walk well in 2 weeks.  Return to the ER for any worsening pain, weakness, numbness, or other concerning symptoms.

## 2025-07-17 NOTE — Clinical Note
"Rhiannon landaverdelucia" Rajni was seen and treated in our emergency department on 7/17/2025.  She may return to work on 07/20/2025.       If you have any questions or concerns, please don't hesitate to call.      Codie Min RN    "